# Patient Record
(demographics unavailable — no encounter records)

---

## 2025-03-21 NOTE — PHYSICAL EXAM
[General Appearance - Alert] : alert [General Appearance - In No Acute Distress] : in no acute distress [General Appearance - Well Nourished] : well nourished [General Appearance - Well-Appearing] : healthy appearing [Sclera] : the sclera and conjunctiva were normal [PERRL With Normal Accommodation] : pupils were equal in size, round, reactive to light [Extraocular Movements] : extraocular movements were intact [Outer Ear] : the ears and nose were normal in appearance [Hearing Threshold Finger Rub Not [place]] : hearing was normal [Examination Of The Oral Cavity] : the lips and gums were normal [Both Tympanic Membranes Were Examined] : both tympanic membranes were normal [Oropharynx] : the oropharynx was normal with no thrush [FreeTextEntry1] : bilat TM cerumen impaction [Neck Appearance] : the appearance of the neck was normal [Neck Cervical Mass (___cm)] : no neck mass was observed [Jugular Venous Distention Increased] : there was no jugular-venous distention [Thyroid Diffuse Enlargement] : the thyroid was not enlarged [Respiration, Rhythm And Depth] : normal respiratory rhythm and effort [Exaggerated Use Of Accessory Muscles For Inspiration] : no accessory muscle use [Auscultation Breath Sounds / Voice Sounds] : lungs were clear to auscultation bilaterally [Heart Rate And Rhythm] : heart rate was normal and rhythm regular [Heart Sounds] : normal S1 and S2 [Heart Sounds Gallop] : no gallops [Murmurs] : no murmurs [Heart Sounds Pericardial Friction Rub] : no pericardial rub [Edema] : there was no peripheral edema [Bowel Sounds] : normal bowel sounds [Abdomen Soft] : soft [Abdomen Tenderness] : non-tender [Costovertebral Angle Tenderness] : no CVA tenderness [No Palpable Adenopathy] : no palpable adenopathy [Cervical Lymph Nodes Enlarged Posterior Bilaterally] : posterior cervical [Cervical Lymph Nodes Enlarged Anterior Bilaterally] : anterior cervical [Musculoskeletal - Swelling] : no joint swelling [Range of Motion to Joints] : range of motion to joints [Nail Clubbing] : no clubbing  or cyanosis of the fingernails [Motor Tone] : muscle strength and tone were normal [Skin Color & Pigmentation] : normal skin color and pigmentation [] : no rash [Skin Lesions] : no skin lesions [Cranial Nerves] : cranial nerves 2-12 were intact [Deep Tendon Reflexes (DTR)] : deep tendon reflexes were 2+ and symmetric [Sensation] : the sensory exam was normal to light touch and pinprick [Motor Exam] : the motor exam was normal [No Focal Deficits] : no focal deficits [Oriented To Time, Place, And Person] : oriented to person, place, and time [Affect] : the affect was normal

## 2025-03-21 NOTE — ASSESSMENT
[FreeTextEntry1] : HIV annual consult.  HIV stable   3/21/2025 Plan HIV 1. continue current treatment - refills given 2. do blood work 3. f/u 6 months  Bilat Cerumen Impaction  1. referral to ENT given   Pelvic pain  1. US pelvis ordered 2. referral to ENT given for further evaluation.     [Treatment Education] : treatment education [Treatment Adherence] : treatment adherence [Medical Care Issues] : medical care issues

## 2025-03-21 NOTE — HISTORY OF PRESENT ILLNESS
[FreeTextEntry1] : 38 yo female here for HIV annual consult  taking Genvoya daily with no missed doses or SE  pt noted with intermittent sharp, sudden onset pelvic pain  denies any nausea, vomiting, constipation, diarrhea pt no longer with menstruation in years.  pt consulted with GYN last year and was ordered US pelvis.  Pt did not do imaging or f/u with GYN for management.  would like to renew US order and new referral to GYN  denies taking any medications for this   Sexual hx : Heterosexual.  Currently sexually active with usual partner.  confirms condom use.  declines STI testing today    From previous consult 8/16/2024 ;  37 yo female here for HIV f/u consult taking Genvoya daily with no missed doses or SE denies any c/o at this time  Sexual hx : Heterosexual. Currently sexually active with usual partner. confirms condom use.   3/21/2025 Plan HIV 1. continue current treatment - refills given 2. do blood work 3. f/u 6 months  Bilat Cerumen Impaction  1. referral to ENT given   Pelvic pain  1. US pelvis ordered 2. referral to ENT given for further evaluation.